# Patient Record
Sex: FEMALE | Race: WHITE | NOT HISPANIC OR LATINO | Employment: OTHER | URBAN - METROPOLITAN AREA
[De-identification: names, ages, dates, MRNs, and addresses within clinical notes are randomized per-mention and may not be internally consistent; named-entity substitution may affect disease eponyms.]

---

## 2018-09-17 ENCOUNTER — HOSPITAL ENCOUNTER (OUTPATIENT)
Dept: RADIOLOGY | Facility: HOSPITAL | Age: 63
Discharge: HOME | End: 2018-09-17
Attending: INTERNAL MEDICINE
Payer: MEDICARE

## 2018-09-17 DIAGNOSIS — M79.671 RIGHT FOOT PAIN: ICD-10-CM

## 2018-09-17 PROCEDURE — 73630 X-RAY EXAM OF FOOT: CPT | Mod: TC,FY,RT

## 2018-09-17 PROCEDURE — 73630 X-RAY EXAM OF FOOT: CPT | Mod: 26,RT,, | Performed by: RADIOLOGY

## 2023-02-19 ENCOUNTER — HOSPITAL ENCOUNTER (EMERGENCY)
Facility: HOSPITAL | Age: 68
Discharge: HOME | End: 2023-02-19
Attending: EMERGENCY MEDICINE
Payer: MEDICARE

## 2023-02-19 VITALS
WEIGHT: 125 LBS | HEART RATE: 76 BPM | TEMPERATURE: 98 F | DIASTOLIC BLOOD PRESSURE: 73 MMHG | BODY MASS INDEX: 20.09 KG/M2 | HEIGHT: 66 IN | SYSTOLIC BLOOD PRESSURE: 158 MMHG | OXYGEN SATURATION: 97 % | RESPIRATION RATE: 16 BRPM

## 2023-02-19 DIAGNOSIS — R55 SYNCOPE: ICD-10-CM

## 2023-02-19 DIAGNOSIS — S09.90XA CLOSED HEAD INJURY: ICD-10-CM

## 2023-02-19 DIAGNOSIS — S01.511A LACERATION OF LOWER LIP, INITIAL ENCOUNTER: ICD-10-CM

## 2023-02-19 DIAGNOSIS — S39.012A LUMBAR STRAIN, INITIAL ENCOUNTER: Primary | ICD-10-CM

## 2023-02-19 LAB
ALBUMIN SERPL BCP-MCNC: 4.1 G/DL (ref 3.5–5.2)
ALP SERPL-CCNC: 66 U/L (ref 55–135)
ALT SERPL W/O P-5'-P-CCNC: 55 U/L (ref 10–44)
ANION GAP SERPL CALC-SCNC: 11 MMOL/L (ref 8–16)
AST SERPL-CCNC: 51 U/L (ref 10–40)
BASOPHILS # BLD AUTO: 0.01 K/UL (ref 0–0.2)
BASOPHILS NFR BLD: 0.1 % (ref 0–1.9)
BILIRUB SERPL-MCNC: 0.4 MG/DL (ref 0.1–1)
BUN SERPL-MCNC: 14 MG/DL (ref 8–23)
CALCIUM SERPL-MCNC: 9.2 MG/DL (ref 8.7–10.5)
CHLORIDE SERPL-SCNC: 100 MMOL/L (ref 95–110)
CO2 SERPL-SCNC: 27 MMOL/L (ref 23–29)
CREAT SERPL-MCNC: 0.8 MG/DL (ref 0.5–1.4)
DIFFERENTIAL METHOD: ABNORMAL
EOSINOPHIL # BLD AUTO: 0 K/UL (ref 0–0.5)
EOSINOPHIL NFR BLD: 0.1 % (ref 0–8)
ERYTHROCYTE [DISTWIDTH] IN BLOOD BY AUTOMATED COUNT: 12.8 % (ref 11.5–14.5)
EST. GFR  (NO RACE VARIABLE): >60 ML/MIN/1.73 M^2
GLUCOSE SERPL-MCNC: 105 MG/DL (ref 70–110)
HCT VFR BLD AUTO: 42.6 % (ref 37–48.5)
HGB BLD-MCNC: 14.2 G/DL (ref 12–16)
IMM GRANULOCYTES # BLD AUTO: 0.03 K/UL (ref 0–0.04)
IMM GRANULOCYTES NFR BLD AUTO: 0.3 % (ref 0–0.5)
LYMPHOCYTES # BLD AUTO: 0.7 K/UL (ref 1–4.8)
LYMPHOCYTES NFR BLD: 7.2 % (ref 18–48)
MAGNESIUM SERPL-MCNC: 2.4 MG/DL (ref 1.6–2.6)
MCH RBC QN AUTO: 31 PG (ref 27–31)
MCHC RBC AUTO-ENTMCNC: 33.3 G/DL (ref 32–36)
MCV RBC AUTO: 93 FL (ref 82–98)
MONOCYTES # BLD AUTO: 0.6 K/UL (ref 0.3–1)
MONOCYTES NFR BLD: 6 % (ref 4–15)
NEUTROPHILS # BLD AUTO: 8.3 K/UL (ref 1.8–7.7)
NEUTROPHILS NFR BLD: 86.3 % (ref 38–73)
NRBC BLD-RTO: 0 /100 WBC
PLATELET # BLD AUTO: 208 K/UL (ref 150–450)
PMV BLD AUTO: 10.3 FL (ref 9.2–12.9)
POTASSIUM SERPL-SCNC: 4.3 MMOL/L (ref 3.5–5.1)
PROT SERPL-MCNC: 7.2 G/DL (ref 6–8.4)
RBC # BLD AUTO: 4.58 M/UL (ref 4–5.4)
SODIUM SERPL-SCNC: 138 MMOL/L (ref 136–145)
WBC # BLD AUTO: 9.62 K/UL (ref 3.9–12.7)

## 2023-02-19 PROCEDURE — 90715 TDAP VACCINE 7 YRS/> IM: CPT | Performed by: EMERGENCY MEDICINE

## 2023-02-19 PROCEDURE — 85025 COMPLETE CBC W/AUTO DIFF WBC: CPT | Performed by: EMERGENCY MEDICINE

## 2023-02-19 PROCEDURE — 83735 ASSAY OF MAGNESIUM: CPT | Performed by: EMERGENCY MEDICINE

## 2023-02-19 PROCEDURE — 93005 ELECTROCARDIOGRAM TRACING: CPT

## 2023-02-19 PROCEDURE — 12051 INTMD RPR FACE/MM 2.5 CM/<: CPT

## 2023-02-19 PROCEDURE — 99285 EMERGENCY DEPT VISIT HI MDM: CPT | Mod: 25

## 2023-02-19 PROCEDURE — 90471 IMMUNIZATION ADMIN: CPT | Performed by: EMERGENCY MEDICINE

## 2023-02-19 PROCEDURE — 25000003 PHARM REV CODE 250

## 2023-02-19 PROCEDURE — 63600175 PHARM REV CODE 636 W HCPCS: Performed by: EMERGENCY MEDICINE

## 2023-02-19 PROCEDURE — 25000003 PHARM REV CODE 250: Performed by: EMERGENCY MEDICINE

## 2023-02-19 PROCEDURE — 93010 ELECTROCARDIOGRAM REPORT: CPT | Mod: ,,, | Performed by: INTERNAL MEDICINE

## 2023-02-19 PROCEDURE — 96360 HYDRATION IV INFUSION INIT: CPT

## 2023-02-19 PROCEDURE — 93010 EKG 12-LEAD: ICD-10-PCS | Mod: ,,, | Performed by: INTERNAL MEDICINE

## 2023-02-19 PROCEDURE — 80053 COMPREHEN METABOLIC PANEL: CPT | Performed by: EMERGENCY MEDICINE

## 2023-02-19 RX ORDER — LIDOCAINE HYDROCHLORIDE AND EPINEPHRINE 10; 10 MG/ML; UG/ML
10 INJECTION, SOLUTION INFILTRATION; PERINEURAL ONCE
Status: COMPLETED | OUTPATIENT
Start: 2023-02-19 | End: 2023-02-19

## 2023-02-19 RX ORDER — ONDANSETRON 4 MG/1
4 TABLET, FILM COATED ORAL EVERY 8 HOURS PRN
Qty: 12 TABLET | Refills: 0 | Status: SHIPPED | OUTPATIENT
Start: 2023-02-19

## 2023-02-19 RX ORDER — ACETAMINOPHEN 500 MG
1000 TABLET ORAL EVERY 8 HOURS PRN
Qty: 40 TABLET | Refills: 0 | Status: SHIPPED | OUTPATIENT
Start: 2023-02-19

## 2023-02-19 RX ADMIN — BACITRACIN ZINC, NEOMYCIN SULFATE, POLYMYXIN B SULFATE 1 EACH: 3.5; 5000; 4 OINTMENT TOPICAL at 12:02

## 2023-02-19 RX ADMIN — TETANUS TOXOID, REDUCED DIPHTHERIA TOXOID AND ACELLULAR PERTUSSIS VACCINE, ADSORBED 0.5 ML: 5; 2.5; 8; 8; 2.5 SUSPENSION INTRAMUSCULAR at 12:02

## 2023-02-19 RX ADMIN — SODIUM CHLORIDE 1000 ML: 9 INJECTION, SOLUTION INTRAVENOUS at 11:02

## 2023-02-19 RX ADMIN — LIDOCAINE HYDROCHLORIDE,EPINEPHRINE BITARTRATE 10 ML: 10; .01 INJECTION, SOLUTION INFILTRATION; PERINEURAL at 12:02

## 2023-02-19 NOTE — DISCHARGE INSTRUCTIONS
Lots of liquids, only while you have vomiting and diarrhea.  Please return immediately if you get worse or if new problems develop.  Zofran for nausea.  Tylenol, 1000 mg by mouth every 8 hours as needed for discomfort.  Return immediately if you get worse or if new problems develop.

## 2023-02-19 NOTE — ED TRIAGE NOTES
Juan Israel, a 68 y.o. female presents to the ED w/ complaint of n/v/d x last night due to food poisoning and split lip due to fall at 3am this morning. Pt states she had LOC while on the toilet and complains of pain to the head, mouth, lower back, and neck. Pt is AAOX4, VSS, and NADN.     Triage note:  Chief Complaint   Patient presents with    Loss of Consciousness    Mouth Injury     The patient reports that she was having nausea, vomiting that started last night after eating. She reports that around 0400 this morning, she got up to go to the bathroom and had a syncopal episode. Reports cutting her lower lip with her partial. Complains of pain to neck, lower back, and mouth. Patient is unsure if she hit her head.      Review of patient's allergies indicates:  No Known Allergies  Past Medical History:   Diagnosis Date    Alcohol abuse     Bipolar 1 disorder     Depression     GERD (gastroesophageal reflux disease)     Substance abuse

## 2023-02-19 NOTE — ED PROVIDER NOTES
Encounter Date: 2/19/2023    SCRIBE #1 NOTE: I, KARENA ASTORGA, am scribing for, and in the presence of,  Jose Medina MD. I have scribed the following portions of the note - Other sections scribed: HPI, ROS.     History     Chief Complaint   Patient presents with    Loss of Consciousness    Mouth Injury     The patient reports that she was having nausea, vomiting that started last night after eating. She reports that around 0400 this morning, she got up to go to the bathroom and had a syncopal episode. Reports cutting her lower lip with her partial. Complains of pain to neck, lower back, and mouth. Patient is unsure if she hit her head.      68-year-old female, with a PMHx of GERD, presents to the ED with complaints of syncope and mouth laceration onset this morning. Patient states that she was sitting on the toilet when she had a syncopal episode and fell off of the toilet. She further states that her tooth went through her lip causing a laceration to her bottom lip and hitting the left side of her head. She reports that she has food poisoning and states that she had eight episodes of emesis and twelve episodes of diarrhea. No other exacerbating or alleviating factors. Patient denies cough, shortness of breath, chest pain, fever, chills, abdominal pain, nausea, dysuria, headaches, congestion, sore throat, arm or leg trouble, eye pain, ear pain, rash, or other associated symptoms.       The history is provided by the patient. No  was used.   Review of patient's allergies indicates:  No Known Allergies  Past Medical History:   Diagnosis Date    Alcohol abuse     Bipolar 1 disorder     Depression     GERD (gastroesophageal reflux disease)     Substance abuse      Past Surgical History:   Procedure Laterality Date    HYSTERECTOMY       History reviewed. No pertinent family history.  Social History     Tobacco Use    Smoking status: Every Day     Packs/day: 1.00     Types: Cigarettes   Substance  Use Topics    Alcohol use: No     Comment: stopped for 18 months    Drug use: No     Review of Systems   Constitutional:  Negative for chills, diaphoresis and fever.   HENT:  Negative for congestion, ear pain and sore throat.    Eyes:  Negative for pain.   Respiratory:  Negative for cough and shortness of breath.    Cardiovascular:  Negative for chest pain.   Gastrointestinal:  Positive for diarrhea and vomiting. Negative for abdominal pain and nausea.   Genitourinary:  Negative for dysuria.   Musculoskeletal:  Negative for back pain.        (-) Arm or leg trouble.    Skin:  Positive for wound. Negative for rash.   Neurological:  Positive for syncope. Negative for headaches.   Psychiatric/Behavioral:  Negative for confusion.      Physical Exam     Initial Vitals [02/19/23 1009]   BP Pulse Resp Temp SpO2   139/71 97 16 98.3 °F (36.8 °C) 95 %      MAP       --         Physical Exam  The patient was examined specifically for the following:   General:No significant distress, Good color, Warm and dry. Head and neck:Scalp atraumatic, Neck supple. Neurological:Appropriate conversation, Gross motor deficits. Eyes:Conjugate gaze, Clear corneas. ENT: No epistaxis. Cardiac: Regular rate and rhythm, Grossly normal heart tones. Pulmonary: Wheezing, Rales. Gastrointestinal: Abdominal tenderness, Abdominal distention. Musculoskeletal: Extremity deformity, Apparent pain with range of motion of the joints. Skin: Rash.   The findings on examination were normal except for the following:  Patient has laceration of the right lower lip.  Is full-thickness through and through.  That does not violate the vermilion border.  It does extend to the oral mucosa.  The lungs are clear.  The heart tones are normal.  The abdomen is soft.  Vital signs are stable.  ED Course   Lac Repair    Date/Time: 2/19/2023 12:39 PM  Performed by: Miguel Bruno MD  Authorized by: Jose Medina MD     Consent:     Consent obtained:  Verbal    Consent given  by:  Patient    Risks, benefits, and alternatives were discussed: yes      Risks discussed:  Infection, poor cosmetic result and poor wound healing  Universal protocol:     Procedure explained and questions answered to patient or proxy's satisfaction: yes    Anesthesia:     Anesthesia method:  Local infiltration    Local anesthetic:  Lidocaine 1% WITH epi  Laceration details:     Location:  Lip    Lip location:  Lower lip, full thickness    Vermilion border involved: yes      Length (cm):  2    Depth (mm):  10  Exploration:     Limited defect created (wound extended): no      Hemostasis achieved with:  Direct pressure  Treatment:     Area cleansed with:  Saline    Amount of cleaning:  Standard    Irrigation solution:  Sterile saline    Irrigation method:  Pressure wash    Debridement:  None    Undermining:  None    Scar revision: yes      Layers/structures repaired:  Muscle belly  Muscle belly:     Suture size:  6-0    Suture material:  Vicryl    Suture technique:  Horizontal mattress    Number of sutures:  1  Skin repair:     Repair method:  Sutures    Suture size:  5-0    Suture material:  Prolene    Number of sutures:  3  Approximation:     Approximation:  Close    Vermilion border well-aligned: yes    Repair type:     Repair type:  Simple  Post-procedure details:     Dressing:  Antibiotic ointment    Procedure completion:  Tolerated well, no immediate complications  Labs Reviewed   COMPREHENSIVE METABOLIC PANEL - Abnormal; Notable for the following components:       Result Value    AST 51 (*)     ALT 55 (*)     All other components within normal limits   CBC W/ AUTO DIFFERENTIAL - Abnormal; Notable for the following components:    Gran # (ANC) 8.3 (*)     Lymph # 0.7 (*)     Gran % 86.3 (*)     Lymph % 7.2 (*)     All other components within normal limits   MAGNESIUM     EKG Readings: (Independently Interpreted)   This patient is in a normal sinus rhythm with a heart rate of 80.  There are no significant ST  segment or T-wave changes.  There is no evidence of acute myocardial infarction or malignant arrhythmia.  This is a normal EKG.   ECG Results              EKG 12-lead (Final result)  Result time 02/20/23 16:12:33      Final result by Interface, Lab In [facility] (02/20/23 16:12:33)                   Narrative:    Test Reason : R55,    Vent. Rate : 080 BPM     Atrial Rate : 080 BPM     P-R Int : 140 ms          QRS Dur : 064 ms      QT Int : 368 ms       P-R-T Axes : 063 062 076 degrees     QTc Int : 424 ms    Normal sinus rhythm  Normal ECG  No previous ECGs available  Confirmed by Boo Headley MD ([id number]) on 2/20/2023 4:12:20 PM    Referred By: [email or web address]   SELF           Confirmed By:Boo Headley MD                                  Imaging Results              X-Ray Lumbar Spine Ap And Lateral (Final result)  Result time 02/19/23 12:11:34      Final result by Jania Whittington MD (02/19/23 12:11:34)                   Impression:      As above.      Electronically signed by: Jania Whittington MD  Date:    02/19/2023  Time:    12:11               Narrative:    EXAMINATION:  XR LUMBAR SPINE AP AND LATERAL    CLINICAL HISTORY:  Lumbar strain;    TECHNIQUE:  AP, lateral and spot images were performed of the lumbar spine.    COMPARISON:  None    FINDINGS:  Alignment: Alignment is maintained.    Vertebrae: Vertebral body heights are maintained.  No suspicious appearing lytic or blastic lesions.    Discs and facets: Multilevel disc space narrowing with endplate sclerosis and small marginal osteophytes.  Multilevel facet arthropathy.    Miscellaneous: Aortic vascular calcifications.  Right upper quadrant surgical clips.                                       CT Head Without Contrast (Final result)  Result time 02/19/23 11:56:51      Final result by Gian Boogie MD (02/19/23 11:56:51)                   Impression:      No CT evidence of acute intracranial abnormality.      Electronically signed by: Gian Boogie  MD  Date:    02/19/2023  Time:    11:56               Narrative:    EXAMINATION:  CT HEAD WITHOUT CONTRAST    CLINICAL HISTORY:  Head trauma, minor (Age >= 65y); Unspecified injury of head, initial encounter    TECHNIQUE:  Low dose axial images were obtained through the head.  Coronal and sagittal reformations were also performed. Contrast was not administered.    COMPARISON:  None.    FINDINGS:  No evidence of acute territorial infarct, hemorrhage, mass effect, or midline shift.    Ventricles are normal in size and configuration.    No displaced calvarial fracture.    Visualized paranasal sinuses and mastoid air cells are essentially clear.                                       Medications   LIDOcaine-EPINEPHrine 1%-1:100,000 injection 10 mL (10 mLs Intradermal Given by Other 2/19/23 1211)   Tdap (BOOSTRIX) vaccine injection 0.5 mL (0.5 mLs Intramuscular Given 2/19/23 1250)   sodium chloride 0.9% bolus 1,000 mL 1,000 mL (0 mLs Intravenous Stopped 2/19/23 1220)   neomycin-bacitracnZn-polymyxnB packet 1 each (1 each Topical (Top) Given 2/19/23 1250)     Medical Decision Making:   History:   Old Medical Records: I decided to obtain old medical records.  Independently Interpreted Test(s):   I have ordered and independently interpreted EKG Reading(s) - see prior notes  Clinical Tests:   Lab Tests: Ordered and Reviewed  Radiological Study: Ordered and Reviewed  Medical Tests: Ordered and Reviewed     Staff physician attestation:  I had face time with this patient at 12:55 p.m..  Given the above this patient presents emergency room after having a syncopal episode while having a bowel movement on the commode.  The patient reports that she is had diarrhea and vomiting for 2 days.  She reports 12 episodes of diarrhea and 8 episodes of vomiting in the last 24 hours.  In spite of this she has a good blood pressure and normal pulse.  The patient was rehydrated in the emergency room.  I believe this was a vasovagal syncope.   There is a history of head trauma.  CT of the head fails to reveal intracranial bleeding.  The patient complains of back pain lumbar spine fails to reveal evidence of fracture.  Laboratory evaluation is essentially unremarkable.  The patient sustained a lip laceration that was closed with sutures.  I feel this patient is stable for discharge for clear liquid diet.  I will treat her nausea.  She does not have abdominal pain.  I will use Tylenol for pain.  Of note, the patient had no midline cervical tenderness.  I doubt fractures of the cervical spine.  There were no other extremity injuries.  I specifically doubt peritonitis and bowel obstruction.  There are no electrolyte abnormalities from the diarrhea.  There is no significant anemia to cause concern for syncope.  I agree with the resident documentation, diagnostic and treatment plan.           Scribe Attestation:   Scribe #1: I performed the above scribed service and the documentation accurately describes the services I performed. I attest to the accuracy of the note.                 I personally performed the services described in this documentation.  All medical record  entries made by the scribe are at my direction and in my presence.  Signed, Dr. Medina  Clinical Impression:   Final diagnoses:  [R55] Syncope  [S09.90XA] Closed head injury  [S39.012A] Lumbar strain, initial encounter (Primary)  [S01.511A] Laceration of lower lip, initial encounter        ED Disposition Condition    Discharge Stable          ED Prescriptions       Medication Sig Dispense Start Date End Date Auth. Provider    ondansetron (ZOFRAN) 4 MG tablet Take 1 tablet (4 mg total) by mouth every 8 (eight) hours as needed (Nausea and vomiting). 12 tablet 2/19/2023 -- Jose Medina MD    acetaminophen (TYLENOL) 500 MG tablet Take 2 tablets (1,000 mg total) by mouth every 8 (eight) hours as needed. 40 tablet 2/19/2023 -- Jose Medina MD          Follow-up Information       Follow up  With Specialties Details Why Contact Info    Kathleen Barillas MD Internal Medicine In 3 days  [address]  [facility] [address]  [phone]               Jose Medina MD  02/19/23 1321       Jose Medina MD  02/19/23 2121       Jose Medina MD  03/03/23 0352

## 2024-11-12 ENCOUNTER — LAB VISIT (OUTPATIENT)
Dept: LAB | Facility: HOSPITAL | Age: 69
End: 2024-11-12
Attending: PSYCHIATRY & NEUROLOGY
Payer: MEDICARE

## 2024-11-12 DIAGNOSIS — R41.3 MEMORY LOSS: Primary | ICD-10-CM

## 2024-11-12 PROCEDURE — 36415 COLL VENOUS BLD VENIPUNCTURE: CPT | Performed by: PSYCHIATRY & NEUROLOGY

## 2024-11-12 PROCEDURE — 83520 IMMUNOASSAY QUANT NOS NONAB: CPT | Performed by: PSYCHIATRY & NEUROLOGY

## 2024-11-14 LAB
IMMUNOLOGIST REVIEW: ABNORMAL
M PHOSPHO-TAU 217: 0.2 PG/ML